# Patient Record
Sex: MALE | Race: WHITE | Employment: FULL TIME | URBAN - METROPOLITAN AREA
[De-identification: names, ages, dates, MRNs, and addresses within clinical notes are randomized per-mention and may not be internally consistent; named-entity substitution may affect disease eponyms.]

---

## 2021-03-11 ENCOUNTER — HOSPITAL ENCOUNTER (EMERGENCY)
Age: 56
Discharge: HOME | End: 2021-03-11
Attending: EMERGENCY MEDICINE
Payer: COMMERCIAL

## 2021-03-11 ENCOUNTER — APPOINTMENT (OUTPATIENT)
Dept: ULTRASOUND IMAGING | Age: 56
End: 2021-03-11
Attending: PHYSICIAN ASSISTANT
Payer: COMMERCIAL

## 2021-03-11 VITALS
OXYGEN SATURATION: 99 % | DIASTOLIC BLOOD PRESSURE: 82 MMHG | HEIGHT: 71 IN | BODY MASS INDEX: 31.2 KG/M2 | WEIGHT: 222.88 LBS | TEMPERATURE: 98.4 F | HEART RATE: 60 BPM | RESPIRATION RATE: 12 BRPM | SYSTOLIC BLOOD PRESSURE: 133 MMHG

## 2021-03-11 DIAGNOSIS — I82.812 SAPHENOUS VEIN CLOT, LEFT: Primary | ICD-10-CM

## 2021-03-11 LAB
ANION GAP BLD CALC-SCNC: 15 MMOL/L (ref 10–20)
BUN BLD-MCNC: 11 MG/DL (ref 9–20)
CA-I BLD-MCNC: 1.29 MMOL/L (ref 1.12–1.32)
CHLORIDE BLD-SCNC: 103 MMOL/L (ref 98–107)
CO2 BLD-SCNC: 27 MMOL/L (ref 21–32)
CREAT BLD-MCNC: 1 MG/DL (ref 0.6–1.3)
GLUCOSE BLD-MCNC: 104 MG/DL (ref 65–100)
HCT VFR BLD CALC: 46 % (ref 36.6–50.3)
POTASSIUM BLD-SCNC: 4.3 MMOL/L (ref 3.5–5.1)
SERVICE CMNT-IMP: ABNORMAL
SODIUM BLD-SCNC: 140 MMOL/L (ref 136–145)

## 2021-03-11 PROCEDURE — 80047 BASIC METABLC PNL IONIZED CA: CPT

## 2021-03-11 PROCEDURE — 93971 EXTREMITY STUDY: CPT

## 2021-03-11 PROCEDURE — 99284 EMERGENCY DEPT VISIT MOD MDM: CPT

## 2021-03-11 NOTE — ED NOTES
Pt states that he has area on his left calf that is warm and getting worse since friday. Pt has family hx of clots and factor 5 for himself. Pt denies sob, cp, fevers, chills, n/v/d. Pt resting in bed with call bell within reach. Pt a/o x4.

## 2021-03-11 NOTE — ED NOTES
TRANSFER - IN REPORT:    Verbal and bedside report received from Steve Spann (name) on Suzanne Sethi. Report consisted of patients Situation, Background, Assessment and   Recommendations(SBAR). Information from the following report(s) SBAR and ED Summary was reviewed with the receiving nurse. Opportunity for questions and clarification was provided. US at bedside. 2992: Dr. Rey Blas at bedside. 0920: Pt discharged by Dr. Tay He. Pt provided with discharge instructions Rx and instructions on follow up care. Pt out of ED ambulatory  accompanied by wife.

## 2021-03-11 NOTE — ED PROVIDER NOTES
EMERGENCY DEPARTMENT HISTORY AND PHYSICAL EXAM      Date: 3/11/2021  Patient Name: Dolly Carlisle    History of Presenting Illness     Chief Complaint   Patient presents with    Leg Pain     pt arrives ambultory to the ED with c/o LL leg pain x2 days with worsening pain today. pt states his calf started aching, with redness, and swelling at the site without injury or trauma. pt states he is concerned for a blood clot       History Provided By: Patient    HPI: Dolly Carlisle, 63 y.o. male with significant PMHx for factor 5 liden, presents by POV to the ED with cc of non-traumatic left lower leg pain for the past 4-5 days. He notes that the pain is in his proximal calf and radiates to the popliteal fossa. It is a constant pain that worsens with palpation. They has been no treatment PTA. He denies recent surgeries or sedentary periods. There is been no shortness of breath, chest pain, palpitations. He denies headache, lightheadedness or dizziness. There are no other complaints, changes, or physical findings at this time. Social Hx: Tobacco (occasional smoker), EtOH (2-3 beers nightly), Illicit drug use (denies)     PCP: No primary care provider on file. No current facility-administered medications on file prior to encounter. No current outpatient medications on file prior to encounter. Past History     Past Medical History:  No past medical history on file. Past Surgical History:  No past surgical history on file. Family History:  No family history on file. Social History:  Social History     Tobacco Use    Smoking status: Not on file   Substance Use Topics    Alcohol use: Not on file    Drug use: Not on file       Allergies:  Not on File      Review of Systems   Review of Systems   Constitutional: Negative for chills, diaphoresis and fever. HENT: Negative for congestion, ear pain, rhinorrhea and sore throat. Respiratory: Negative for cough and shortness of breath. Cardiovascular: Negative for chest pain. Gastrointestinal: Negative for abdominal pain, constipation, diarrhea, nausea and vomiting. Genitourinary: Negative for difficulty urinating, dysuria, frequency and hematuria. Musculoskeletal: Positive for joint swelling and myalgias. Negative for arthralgias, back pain and gait problem. Neurological: Negative for headaches. All other systems reviewed and are negative. Physical Exam   Physical Exam  Vitals signs and nursing note reviewed. Constitutional:       General: He is not in acute distress. Appearance: He is well-developed. He is not diaphoretic. Comments: 63 y.o.  male    HENT:      Head: Normocephalic and atraumatic. Eyes:      General:         Right eye: No discharge. Left eye: No discharge. Conjunctiva/sclera: Conjunctivae normal.   Neck:      Musculoskeletal: Normal range of motion and neck supple. Cardiovascular:      Rate and Rhythm: Normal rate and regular rhythm. Pulses: Normal pulses. Heart sounds: Normal heart sounds. No murmur. Pulmonary:      Effort: Pulmonary effort is normal. No respiratory distress. Breath sounds: Normal breath sounds. Musculoskeletal:      Comments: Tender to palpation of the left calf with slight swelling and palpable cord. Palpable pedal pulses. Cap refill less than 2 seconds. Ambulatory with slight limp. Skin:     General: Skin is warm and dry. Neurological:      Mental Status: He is alert and oriented to person, place, and time.    Psychiatric:         Behavior: Behavior normal.         Diagnostic Study Results     Labs -  Recent Results (from the past 24 hour(s))   POC CHEM8    Collection Time: 03/11/21  8:56 AM   Result Value Ref Range    Calcium, ionized (POC) 1.29 1.12 - 1.32 mmol/L    Sodium (POC) 140 136 - 145 mmol/L    Potassium (POC) 4.3 3.5 - 5.1 mmol/L    Chloride (POC) 103 98 - 107 mmol/L    CO2 (POC) 27 21 - 32 mmol/L    Anion gap (POC) 15 10 - 20 mmol/L    Glucose (POC) 104 (H) 65 - 100 mg/dL    BUN (POC) 11 9 - 20 mg/dL    Creatinine (POC) 1.0 0.6 - 1.3 mg/dL    GFRAA, POC >60 >60 ml/min/1.73m2    GFRNA, POC >60 >60 ml/min/1.73m2    Hematocrit (POC) 46 36.6 - 50.3 %    Comment Comment Not Indicated. Radiologic Studies -     Left Lower Venous  Acute non-occlusive thrombus present in the left great saphenous thigh vein. Acute occlusive thrombus present in the left great saphenous calf vein. Varicose vein(s) present. Varicose veins are thrombosed. The veins with thrombus show signs of reduced compressibility during transverse, 2D imaging. Acute superficial thrombophlebitis noted within the left great saphenous vein. The common femoral, saphenous femoral junction, greater saphenous ankle, profunda femoral, femoral, proximal femoral, mid femoral, distal femoral, popliteal, gastrocnemius, soleal, posterior tibial and peroneal vein(s) were imaged in the transverse and longitudinal planes. The vessels showed normal color filling and compressibility. Doppler interrogation of the veins showed phasic and spontaneous flow. The anterior tibial vein(s) were not well visualized. Positive LT Greater Saph clot  Mid thigh to dist calf. Clotted varicose veins prx to mid calf     Medical Decision Making   I am the first provider for this patient. I reviewed the vital signs, available nursing notes, past medical history, past surgical history, family history and social history. Vital Signs-Reviewed the patient's vital signs. Patient Vitals for the past 12 hrs:   Temp Pulse Resp BP SpO2   03/11/21 0619 98.4 °F (36.9 °C) 60 12 (!) 144/94 100 %       Records Reviewed: Nursing Notes    Provider Notes (Medical Decision Making): The patient presents to the ED with non-traumatic leg pain. His vitals are stable. DDx DVT, thrombophlebitis, sprain, strain, spasm, electrolyte abnl.   US shows large clot to the saphenous vein, which should be treated like a DVT. Anticoagulants started. Patient to follow up with vascular surgeon and PCP. ED Course:   Initial assessment performed. The patients presenting problems have been discussed, and they are in agreement with the care plan formulated and outlined with them. I have encouraged them to ask questions as they arise throughout their visit. 8:00 AM   Case discussed with Dr. Anastasiya Lopez and care turned over. He should follow up on kidney function and write patient prescriptions for anticoagulants. Critical Care Time: None    Disposition:  DISCHARGE NOTE:  4:35 AM  The pt is ready for discharge. The pt's signs, symptoms, diagnosis, and discharge instructions have been discussed and pt has conveyed their understanding. The pt is to follow up as recommended or return to ER should their symptoms worsen. Plan has been discussed and pt is in agreement. PLAN:  1. Take Xarelto as discussed  2. Follow-up Information     Follow up With Specialties Details Why Contact Info    William Nascimento MD Vascular Surgery In 1 week  [address] [place] [address]  [phone]          Return to ED if worse     Diagnosis     Clinical Impression:   1. Saphenous vein clot, left          Please note that this dictation was completed with [employer], the computer voice recognition software. Quite often unanticipated grammatical, syntax, homophones, and other interpretive errors are inadvertently transcribed by the computer software. Please disregards these errors. Please excuse any errors that have escaped final proofreading.

## 2021-04-08 ENCOUNTER — HOSPITAL ENCOUNTER (EMERGENCY)
Age: 56
Discharge: HOME | End: 2021-04-08
Attending: EMERGENCY MEDICINE
Payer: COMMERCIAL

## 2021-04-08 ENCOUNTER — APPOINTMENT (OUTPATIENT)
Dept: GENERAL RADIOLOGY | Age: 56
End: 2021-04-08
Attending: EMERGENCY MEDICINE
Payer: COMMERCIAL

## 2021-04-08 ENCOUNTER — APPOINTMENT (OUTPATIENT)
Dept: CT IMAGING | Age: 56
End: 2021-04-08
Payer: COMMERCIAL

## 2021-04-08 VITALS
HEART RATE: 63 BPM | OXYGEN SATURATION: 100 % | WEIGHT: 214.95 LBS | DIASTOLIC BLOOD PRESSURE: 82 MMHG | RESPIRATION RATE: 14 BRPM | BODY MASS INDEX: 29.98 KG/M2 | SYSTOLIC BLOOD PRESSURE: 139 MMHG | TEMPERATURE: 97.9 F

## 2021-04-08 DIAGNOSIS — R07.9 CHEST PAIN, UNSPECIFIED TYPE: Primary | ICD-10-CM

## 2021-04-08 LAB
ALBUMIN SERPL-MCNC: 4.7 G/DL (ref 3.5–5)
ALBUMIN/GLOB SERPL: 1.6 {RATIO} (ref 1.1–2.2)
ALP SERPL-CCNC: 64 U/L (ref 45–117)
ALT SERPL-CCNC: 76 U/L (ref 12–78)
ANION GAP SERPL CALC-SCNC: 4 MMOL/L (ref 5–15)
AST SERPL-CCNC: 46 U/L (ref 15–37)
ATRIAL RATE: 57 BPM
BASOPHILS # BLD: 0.1 K/UL (ref 0–0.1)
BASOPHILS NFR BLD: 1 % (ref 0–1)
BILIRUB SERPL-MCNC: 1.3 MG/DL (ref 0.2–1)
BUN SERPL-MCNC: 15 MG/DL (ref 6–20)
BUN/CREAT SERPL: 15 (ref 12–20)
CALCIUM SERPL-MCNC: 9.1 MG/DL (ref 8.5–10.1)
CALCULATED P AXIS, ECG09: 9 DEGREES
CALCULATED R AXIS, ECG10: -10 DEGREES
CALCULATED T AXIS, ECG11: 18 DEGREES
CHLORIDE SERPL-SCNC: 104 MMOL/L (ref 97–108)
CO2 SERPL-SCNC: 29 MMOL/L (ref 21–32)
CREAT SERPL-MCNC: 1 MG/DL (ref 0.7–1.3)
DIAGNOSIS, 93000: NORMAL
DIFFERENTIAL METHOD BLD: NORMAL
EOSINOPHIL # BLD: 0.2 K/UL (ref 0–0.4)
EOSINOPHIL NFR BLD: 3 % (ref 0–7)
ERYTHROCYTE [DISTWIDTH] IN BLOOD BY AUTOMATED COUNT: 12.9 % (ref 11.5–14.5)
GLOBULIN SER CALC-MCNC: 2.9 G/DL (ref 2–4)
GLUCOSE SERPL-MCNC: 105 MG/DL (ref 65–100)
HCT VFR BLD AUTO: 47.5 % (ref 36.6–50.3)
HGB BLD-MCNC: 16.4 G/DL (ref 12.1–17)
IMM GRANULOCYTES # BLD AUTO: 0 K/UL (ref 0–0.04)
IMM GRANULOCYTES NFR BLD AUTO: 0 % (ref 0–0.5)
LYMPHOCYTES # BLD: 0.9 K/UL (ref 0.8–3.5)
LYMPHOCYTES NFR BLD: 16 % (ref 12–49)
MCH RBC QN AUTO: 32.7 PG (ref 26–34)
MCHC RBC AUTO-ENTMCNC: 34.5 G/DL (ref 30–36.5)
MCV RBC AUTO: 94.8 FL (ref 80–99)
MONOCYTES # BLD: 0.7 K/UL (ref 0–1)
MONOCYTES NFR BLD: 12 % (ref 5–13)
NEUTS SEG # BLD: 3.9 K/UL (ref 1.8–8)
NEUTS SEG NFR BLD: 68 % (ref 32–75)
NRBC # BLD: 0 K/UL (ref 0–0.01)
NRBC BLD-RTO: 0 PER 100 WBC
P-R INTERVAL, ECG05: 142 MS
PLATELET # BLD AUTO: 152 K/UL (ref 150–400)
PMV BLD AUTO: 11.6 FL (ref 8.9–12.9)
POTASSIUM SERPL-SCNC: 4.2 MMOL/L (ref 3.5–5.1)
PROT SERPL-MCNC: 7.6 G/DL (ref 6.4–8.2)
Q-T INTERVAL, ECG07: 422 MS
QRS DURATION, ECG06: 96 MS
QTC CALCULATION (BEZET), ECG08: 410 MS
RBC # BLD AUTO: 5.01 M/UL (ref 4.1–5.7)
SODIUM SERPL-SCNC: 137 MMOL/L (ref 136–145)
TROPONIN I BLD-MCNC: <0.04 NG/ML (ref 0–0.08)
TROPONIN I SERPL-MCNC: <0.05 NG/ML
VENTRICULAR RATE, ECG03: 57 BPM
WBC # BLD AUTO: 5.8 K/UL (ref 4.1–11.1)

## 2021-04-08 PROCEDURE — 74011250637 HC RX REV CODE- 250/637: Performed by: EMERGENCY MEDICINE

## 2021-04-08 PROCEDURE — 99281 EMR DPT VST MAYX REQ PHY/QHP: CPT

## 2021-04-08 PROCEDURE — 84484 ASSAY OF TROPONIN QUANT: CPT

## 2021-04-08 PROCEDURE — 93005 ELECTROCARDIOGRAM TRACING: CPT

## 2021-04-08 PROCEDURE — 71275 CT ANGIOGRAPHY CHEST: CPT

## 2021-04-08 PROCEDURE — 85025 COMPLETE CBC W/AUTO DIFF WBC: CPT

## 2021-04-08 PROCEDURE — 99284 EMERGENCY DEPT VISIT MOD MDM: CPT

## 2021-04-08 PROCEDURE — 36415 COLL VENOUS BLD VENIPUNCTURE: CPT

## 2021-04-08 PROCEDURE — 74011000250 HC RX REV CODE- 250: Performed by: EMERGENCY MEDICINE

## 2021-04-08 PROCEDURE — 74011000636 HC RX REV CODE- 636: Performed by: EMERGENCY MEDICINE

## 2021-04-08 PROCEDURE — 80053 COMPREHEN METABOLIC PANEL: CPT

## 2021-04-08 PROCEDURE — 71045 X-RAY EXAM CHEST 1 VIEW: CPT

## 2021-04-08 RX ADMIN — IOPAMIDOL 100 ML: 755 INJECTION, SOLUTION INTRAVENOUS at 12:32

## 2021-04-08 RX ADMIN — LIDOCAINE HYDROCHLORIDE 40 ML: 20 SOLUTION ORAL; TOPICAL at 14:43

## 2021-04-08 NOTE — CONSULTS
Pt seen and examined    Full consult dictated    Recc repeat enzymes.  If neg can d/c home with outpt stress nuclear or echo

## 2021-04-08 NOTE — ED NOTES
Discharge paperwork provided to patient at this time by Sherren Creek, DO. Vital signs stable. Patient in no apparent distress at this time. Mental status at baseline. Discharge paperwork in hand and prescription instructions if applicable.

## 2021-04-09 NOTE — CONSULTS
[address] [facility]    Name:  Jon Bellamy  MR#:  [id number]  :  1965  ACCOUNT #:  [de-identified]  DATE OF SERVICE:  2021    REQUESTING PHYSICIAN:  Yarelis Hamm DO, [facility] ER.    REASON FOR CONSULTATION:  Evaluate chest pain and left arm pain. HISTORY OF PRESENT ILLNESS:  The patient is a 54-year-old man with a history of hypertension, dyslipidemia, and factor V Leiden deficiency. He presented after he developed midepigastric discomfort and left arm discomfort earlier today. He has no prior history of coronary artery disease. These symptoms occurred at rest.  The patient came to the ER where his initial EKG was unremarkable and initial troponin was negative. We were asked to see the patient for evaluation. 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  He is on his feet a lot and has no trouble walking up steps. No exertional chest pain. No syncope. No palpitations. No episodes of shortness of breath. No prior stress testing. The patient also had a CTA of the chest done today, which showed no evidence of pulmonary embolism. PAST MEDICAL HISTORY:  As noted above, history of recent superficial venous thrombosis in the lower extremity, he was on Eliquis for a few days, history of DJD involving the cervical spine, history of varicose vein. CURRENT MEDICATIONS:  Amlodipine, Naprosyn, atorvastatin, fluoxetine. SURGERIES:  Status post prior neck surgery, status post tonsillectomy and adenoidectomy. SOCIAL HISTORY:  The patient occasionally drinks alcohol and does smoke cigarettes occasionally. 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 FAMILY HISTORY:  The patient's father had a heart attack at age 47. His brother also has coronary artery disease. REVIEW OF SYSTEMS:  As noted above, otherwise noncontributory. PHYSICAL EXAMINATION:  GENERAL:  Exam reveals a middle-aged white male in no acute distress.   VITAL SIGNS:  Blood pressure 139/82, pulse 68, respirations 14, temperature 97.9. HEENT:  Pupils are equal.  NECK:  Supple. No masses or thyromegaly. No cervical or supraclavicular adenopathy. No carotid bruits. No JVD. CHEST:  Clear. No wheezes or crackles. CARDIAC:  Regular rate and rhythm. Normal S1 and S2. No obvious murmurs, rubs, or gallops. ABDOMEN:  Soft, nontender. No masses or organomegaly. Bowel sounds positive. EXTREMITIES:  No cyanosis, clubbing, or edema. Distal pulses 1+ in the feet bilaterally. There are some superficial varicose veins present. NEURO:  No obvious gross motor deficits. SKIN:  Warm and dry. LABORATORY DATA:  Hemoglobin 11.4. Troponin negative. BUN 15, creatinine 1.0. EKG, sinus bradycardia, rate of 57 beats per minute, borderline left axis deviation, nonspecific ST-T changes. IMPRESSION:  1. Chest discomfort of uncertain etiology. 2.  Hypertension. 3.  Dyslipidemia. 4.  History of factor V Leiden deficiency. 5.  History of varicose veins with recent superficial thrombosis in the lower extremity. 6.  Tobacco abuse. 7.  Strong family history of premature coronary disease. RECOMMENDATIONS:  We are currently awaiting the second set of cardiac enzymes. I think if the second set is normal, the patient could be discharged to home with outpatient stress testing. I have recommended he get a stress test done in the next one to two weeks. In the meantime, continue current medications and the patient should return to the ER if his symptoms return. Thank you for this consult.         Becca Lehman MD      [id number]  D:  04/08/2021 15:42  T:  04/08/2021 20:51  JOB #:  [id number]  CC:  MD Tricia Marquez DO

## 2021-04-09 NOTE — ED PROVIDER NOTES
EMERGENCY DEPARTMENT HISTORY AND PHYSICAL EXAM      Date: 4/8/2021  Patient Name: Cece Mai    History of Presenting Illness     Chief Complaint   Patient presents with    Chest Pain     3-4 days of CP and began to cause L arm pain this morning so he came in. pt has factor 5       History Provided By: Patient    HPI: Cece Mai, 63 y.o. male presents to the ED with cc of chest pain. Pt states he has been having mild discomfort in his chest the past 3 days. He was at work this morning and his pain increased and he noted pain radiating into his left arm. He states pain was more severe but now rated 3/10. There are no alleviating or exacerbating factors. He denies any SOA. There has been no cough or cold symptoms. He denies any abdominal pain, n/v/d. He denies any diaphoresis. He denies any headache. There has been no diaphoresis. He denies any melena, hematochezia or BRBPR. There has been no pain or swelling of the lower extremities. He had recently been in the ED and placed on Xarelto for a clot in the saphenous vein. He followed up with Vasc Surgery who discon't Xarelto as saphenous clot does not need to be treated as DVT. There are no other complaints, changes, or physical findings at this time. PCP: Bhumi, MD Jake    No current facility-administered medications on file prior to encounter. Current Outpatient Medications on File Prior to Encounter   Medication Sig Dispense Refill    rivaroxaban (Xarelto) 15 mg tab tablet Take 1 Tab by mouth two (2) times daily (with meals). 43 Tab 0   Taken off Xarelto by Vasc surgery     Past History     Past Medical History:  Recent clot of saphenous vein    Past Surgical History:  None    Family History:  Brother- MI,   Father- MI    Social History:  Social History     Tobacco Use    Smoking status: Yes   Substance Use Topics    Alcohol use: No    Drug use: No       Allergies:   Allergies   Allergen Reactions    Opioids-Meperidine And Related Other (comments)    Penicillins Other (comments)     Caused c-diff         Review of Systems   Review of Systems   Constitutional: Negative. Negative for appetite change, chills, fatigue and fever. HENT: Negative. Negative for congestion, rhinorrhea, sinus pressure and sore throat. Eyes: Negative. Respiratory: Negative. Negative for cough, choking, chest tightness, shortness of breath and wheezing. Cardiovascular: Positive for chest pain (radiating into left arm). Negative for palpitations and leg swelling. Gastrointestinal: Negative for abdominal pain, constipation, diarrhea, nausea and vomiting. Endocrine: Negative. Genitourinary: Negative. Negative for difficulty urinating, dysuria, flank pain and urgency. Musculoskeletal: Negative. Skin: Negative. Neurological: Negative. Negative for dizziness, speech difficulty, weakness, light-headedness, numbness and headaches. Psychiatric/Behavioral: Negative. All other systems reviewed and are negative. Physical Exam   Physical Exam  Vitals signs and nursing note reviewed. Constitutional:       General: He is not in acute distress. Appearance: He is well-developed. He is obese. He is not diaphoretic. HENT:      Head: Normocephalic and atraumatic. Mouth/Throat:      Pharynx: No oropharyngeal exudate. Eyes:      Extraocular Movements: Extraocular movements intact. Conjunctiva/sclera: Conjunctivae normal.      Pupils: Pupils are equal, round, and reactive to light. Neck:      Musculoskeletal: Normal range of motion and neck supple. Vascular: No JVD. Trachea: No tracheal deviation. Cardiovascular:      Rate and Rhythm: Normal rate and regular rhythm. Heart sounds: Normal heart sounds. No murmur. Pulmonary:      Effort: Pulmonary effort is normal. No respiratory distress. Breath sounds: Normal breath sounds. No stridor. No wheezing or rales. Chest:      Chest wall: No tenderness.    Abdominal: General: There is no distension. Palpations: Abdomen is soft. Tenderness: There is no abdominal tenderness. There is no guarding or rebound. Musculoskeletal: Normal range of motion. General: No tenderness. Right lower leg: No edema. Left lower leg: No edema. Skin:     General: Skin is warm and dry. Capillary Refill: Capillary refill takes less than 2 seconds. Neurological:      Mental Status: He is alert and oriented to person, place, and time. Cranial Nerves: No cranial nerve deficit. Comments: No gross motor or sensory deficits    Psychiatric:         Mood and Affect: Mood normal.         Behavior: Behavior normal.         Diagnostic Study Results     Labs -     Recent Results (from the past 24 hour(s))   EKG, 12 LEAD, INITIAL    Collection Time: 04/08/21  9:11 AM   Result Value Ref Range    Ventricular Rate 57 BPM    Atrial Rate 57 BPM    P-R Interval 142 ms    QRS Duration 96 ms    Q-T Interval 422 ms    QTC Calculation (Bezet) 410 ms    Calculated P Axis 9 degrees    Calculated R Axis -10 degrees    Calculated T Axis 18 degrees    Diagnosis       Sinus bradycardia  No previous ECGs available  Confirmed by MAGNO Chavez ([id number]) on 4/8/2021 5:24:49 PM     CBC WITH AUTOMATED DIFF    Collection Time: 04/08/21  9:22 AM   Result Value Ref Range    WBC 5.8 4.1 - 11.1 K/uL    RBC 5.01 4.10 - 5.70 M/uL    HGB 16.4 12.1 - 17.0 g/dL    HCT 47.5 36.6 - 50.3 %    MCV 94.8 80.0 - 99.0 FL    MCH 32.7 26.0 - 34.0 PG    MCHC 34.5 30.0 - 36.5 g/dL    RDW 12.9 11.5 - 14.5 %    PLATELET 013 018 - 586 K/uL    MPV 11.6 8.9 - 12.9 FL    NRBC 0.0 0  WBC    ABSOLUTE NRBC 0.00 0.00 - 0.01 K/uL    NEUTROPHILS 68 32 - 75 %    LYMPHOCYTES 16 12 - 49 %    MONOCYTES 12 5 - 13 %    EOSINOPHILS 3 0 - 7 %    BASOPHILS 1 0 - 1 %    IMMATURE GRANULOCYTES 0 0.0 - 0.5 %    ABS. NEUTROPHILS 3.9 1.8 - 8.0 K/UL    ABS. LYMPHOCYTES 0.9 0.8 - 3.5 K/UL    ABS. MONOCYTES 0.7 0.0 - 1.0 K/UL    ABS. EOSINOPHILS 0.2 0.0 - 0.4 K/UL    ABS. BASOPHILS 0.1 0.0 - 0.1 K/UL    ABS. IMM. GRANS. 0.0 0.00 - 0.04 K/UL    DF AUTOMATED     METABOLIC PANEL, COMPREHENSIVE    Collection Time: 04/08/21  9:22 AM   Result Value Ref Range    Sodium 137 136 - 145 mmol/L    Potassium 4.2 3.5 - 5.1 mmol/L    Chloride 104 97 - 108 mmol/L    CO2 29 21 - 32 mmol/L    Anion gap 4 (L) 5 - 15 mmol/L    Glucose 105 (H) 65 - 100 mg/dL    BUN 15 6 - 20 MG/DL    Creatinine 1.00 0.70 - 1.30 MG/DL    BUN/Creatinine ratio 15 12 - 20      GFR est AA >60 >60 ml/min/1.73m2    GFR est non-AA >60 >60 ml/min/1.73m2    Calcium 9.1 8.5 - 10.1 MG/DL    Bilirubin, total 1.3 (H) 0.2 - 1.0 MG/DL    ALT (SGPT) 76 12 - 78 U/L    AST (SGOT) 46 (H) 15 - 37 U/L    Alk. phosphatase 64 45 - 117 U/L    Protein, total 7.6 6.4 - 8.2 g/dL    Albumin 4.7 3.5 - 5.0 g/dL    Globulin 2.9 2.0 - 4.0 g/dL    A-G Ratio 1.6 1.1 - 2.2     TROPONIN I    Collection Time: 04/08/21  9:22 AM   Result Value Ref Range    Troponin-I, Qt. <0.05 <0.05 ng/mL   POC TROPONIN-I    Collection Time: 04/08/21  2:46 PM   Result Value Ref Range    Troponin-I (POC) <0.04 0.00 - 0.08 ng/mL         Radiologic Studies -   CTA CHEST W OR W WO CONT   Final Result   No pulmonary emboli are identified. . The lungs are clear of an acute   process. .         XR CHEST PORT   Final Result   1. There is no pneumonia. 2. The lungs are mildly hyperinflated        CT Results  (Last 48 hours)               04/08/21 1232  CTA CHEST W OR W WO CONT Final result    Impression:  No pulmonary emboli are identified. . The lungs are clear of an acute   process. .           Narrative:  INDICATION:   chest pain, h/o clots, on blood thinner currently       COMPARISON: None. TECHNIQUE:    Precontrast  images were obtained to localize the volume for acquisition.    Multislice helical CT arteriography was performed from the diaphragm to the   thoracic inlet during uneventful rapid bolus intravenous administration of 100 mL of Isovue-370. Lung and soft tissue windows were generated. Post processing   was performed and coronal reformatted images were also generated. 3 D imaging   was performed. CT dose reduction was achieved through use of a standardized   protocol tailored for this examination and automatic exposure control for dose   modulation. FINDINGS:   The lungs are clear of mass, nodule, airspace disease or edema. The pulmonary arteries are well enhanced and no pulmonary emboli are identified. There is no mediastinal or hilar adenopathy or mass. The aorta enhances normally   without evidence of aneurysm or dissection. The visualized portions of the upper abdominal organs are normal.               CXR Results  (Last 48 hours)               04/08/21 1024  XR CHEST PORT Final result    Impression:  1. There is no pneumonia. 2. The lungs are mildly hyperinflated       Narrative:  EXAM: XR CHEST PORT       INDICATION: chest pain       COMPARISON: None. FINDINGS: A portable AP radiograph of the chest was obtained at 1028 hours. The   heart size is normal. The lungs are deeply aerated and mildly hyperinflated. No   pneumonia. No pulmonary edema. Osseous structures reveal that the patient is   status post cervical spine surgery. Medical Decision Making   I am the first provider for this patient. I reviewed the vital signs, available nursing notes, past medical history, past surgical history, family history and social history. Vital Signs-Reviewed the patient's vital signs. EKG interpretation: (Preliminary)  Sinus ilsa, rate 57, normal axis/pr/qrs, no acute ST changes, Tate Passe, DO      Records Reviewed: Nursing Notes, Old Medical Records, Previous Radiology Studies and Previous Laboratory Studies    Provider Notes (Medical Decision Making):   DDx- ACS< chest wall pain, GERD, PE, pneumonia,     ED Course:   Initial assessment performed.  The patients presenting problems have been discussed, and they are in agreement with the care plan formulated and outlined with them. I have encouraged them to ask questions as they arise throughout their visit. Pt with some improvement following GI cocktail, but not resolved, wished to defer other meds. Some of his pain pleuritic in nature will obtain CT to r/o PE.     CT neg, pt with substantial family history. Will consult Cards. Consult:  Case discussed with Dr. Marie Xavier. He has evaluated pt Pt can be dc home will arrange for follow-up and outpt stress test.      Disposition:  DC home     DISCHARGE PLAN:  1. Discharge Medication List as of 4/8/2021  4:41 PM        2. Follow-up Information     Follow up With Specialties Details Why Contact Info    Lucia Ashraf MD Cardiology   [address]  [id number]  [facility]. [phone]          3. Return to ED if worse     Diagnosis     Clinical Impression:   1. Chest pain, unspecified type        Attestations:    Ellen Blevins, DO    Please note that this dictation was completed with [employer], the computer voice recognition software. Quite often unanticipated grammatical, syntax, homophones, and other interpretive errors are inadvertently transcribed by the computer software. Please disregard these errors. Please excuse any errors that have escaped final proofreading. Thank you.

## 2021-04-20 ENCOUNTER — OFFICE VISIT (OUTPATIENT)
Dept: INTERNAL MEDICINE CLINIC | Age: 56
End: 2021-04-20
Payer: COMMERCIAL

## 2021-04-20 VITALS
SYSTOLIC BLOOD PRESSURE: 114 MMHG | BODY MASS INDEX: 41.04 KG/M2 | DIASTOLIC BLOOD PRESSURE: 79 MMHG | HEIGHT: 61 IN | TEMPERATURE: 96.1 F | RESPIRATION RATE: 16 BRPM | OXYGEN SATURATION: 96 % | HEART RATE: 61 BPM | WEIGHT: 217.4 LBS

## 2021-04-20 DIAGNOSIS — D68.51 FACTOR V LEIDEN (HCC): ICD-10-CM

## 2021-04-20 DIAGNOSIS — I10 ESSENTIAL HYPERTENSION: ICD-10-CM

## 2021-04-20 DIAGNOSIS — F41.9 ANXIETY: ICD-10-CM

## 2021-04-20 DIAGNOSIS — M77.11 LATERAL EPICONDYLITIS OF RIGHT ELBOW: Primary | ICD-10-CM

## 2021-04-20 DIAGNOSIS — E78.2 MIXED HYPERLIPIDEMIA: ICD-10-CM

## 2021-04-20 DIAGNOSIS — M54.12 CERVICAL RADICULOPATHY: ICD-10-CM

## 2021-04-20 PROCEDURE — 99204 OFFICE O/P NEW MOD 45 MIN: CPT | Performed by: INTERNAL MEDICINE

## 2021-04-20 RX ORDER — NAPROXEN 500 MG/1
500 TABLET ORAL 2 TIMES DAILY WITH MEALS
COMMUNITY

## 2021-04-20 RX ORDER — ZOLPIDEM TARTRATE 10 MG/1
TABLET ORAL
COMMUNITY
End: 2021-05-24

## 2021-04-20 RX ORDER — SILDENAFIL CITRATE 20 MG/1
20 TABLET ORAL 3 TIMES DAILY
COMMUNITY
End: 2022-09-14 | Stop reason: SDUPTHER

## 2021-04-20 RX ORDER — DIAZEPAM 2 MG/1
2 TABLET ORAL
Qty: 30 TAB | Refills: 1 | Status: SHIPPED | OUTPATIENT
Start: 2021-04-20 | End: 2022-01-25

## 2021-04-20 RX ORDER — ATORVASTATIN CALCIUM 10 MG/1
TABLET, FILM COATED ORAL DAILY
COMMUNITY
End: 2022-03-21 | Stop reason: ALTCHOICE

## 2021-04-20 RX ORDER — LISINOPRIL 10 MG/1
TABLET ORAL DAILY
COMMUNITY
End: 2022-03-22 | Stop reason: SDUPTHER

## 2021-04-20 RX ORDER — OMEPRAZOLE 20 MG/1
20 TABLET, DELAYED RELEASE ORAL DAILY
COMMUNITY

## 2021-04-20 RX ORDER — BENZONATATE 100 MG/1
100 CAPSULE ORAL
COMMUNITY
End: 2022-08-04

## 2021-04-20 RX ORDER — CYCLOBENZAPRINE HCL 10 MG
TABLET ORAL
COMMUNITY
End: 2022-02-04 | Stop reason: SDUPTHER

## 2021-04-20 RX ORDER — FLUOXETINE HYDROCHLORIDE 20 MG/1
CAPSULE ORAL DAILY
COMMUNITY
End: 2022-03-22 | Stop reason: SDUPTHER

## 2021-04-20 RX ORDER — AMLODIPINE BESYLATE AND ATORVASTATIN CALCIUM 10; 10 MG/1; MG/1
1 TABLET, FILM COATED ORAL DAILY
COMMUNITY
End: 2022-02-04 | Stop reason: SDUPTHER
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
## 2021-05-24 DIAGNOSIS — G47.00 INSOMNIA, UNSPECIFIED TYPE: Primary | ICD-10-CM

## 2021-05-24 RX ORDER — ZOLPIDEM TARTRATE 10 MG/1
TABLET ORAL
Qty: 30 TABLET | Refills: 2 | Status: SHIPPED | OUTPATIENT
Start: 2021-05-24 | End: 2021-10-25

## 2021-10-25 DIAGNOSIS — G47.00 INSOMNIA, UNSPECIFIED TYPE: ICD-10-CM

## 2021-10-25 RX ORDER — ZOLPIDEM TARTRATE 10 MG/1
TABLET ORAL
Qty: 30 TABLET | Refills: 2 | Status: SHIPPED | OUTPATIENT
Start: 2021-10-25 | End: 2022-03-28

## 2022-01-14 ENCOUNTER — VIRTUAL VISIT (OUTPATIENT)
Dept: INTERNAL MEDICINE CLINIC | Age: 57
End: 2022-01-14
Payer: COMMERCIAL

## 2022-01-14 DIAGNOSIS — F41.8 DEPRESSION WITH ANXIETY: ICD-10-CM

## 2022-01-14 DIAGNOSIS — I10 ESSENTIAL HYPERTENSION: ICD-10-CM

## 2022-01-14 DIAGNOSIS — Z12.5 SCREENING FOR PROSTATE CANCER: ICD-10-CM

## 2022-01-14 DIAGNOSIS — E78.2 MIXED HYPERLIPIDEMIA: ICD-10-CM

## 2022-01-14 DIAGNOSIS — F90.9 ATTENTION DEFICIT HYPERACTIVITY DISORDER (ADHD), UNSPECIFIED ADHD TYPE: ICD-10-CM

## 2022-01-14 DIAGNOSIS — Z11.59 ENCOUNTER FOR HEPATITIS C SCREENING TEST FOR LOW RISK PATIENT: ICD-10-CM

## 2022-01-14 DIAGNOSIS — R63.0 ANOREXIA: Primary | ICD-10-CM

## 2022-01-14 PROCEDURE — 99214 OFFICE O/P EST MOD 30 MIN: CPT | Performed by: INTERNAL MEDICINE

## 2022-01-14 RX ORDER — AMLODIPINE BESYLATE 10 MG/1
TABLET ORAL DAILY
COMMUNITY
End: 2022-03-21 | Stop reason: ALTCHOICE
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
## 2022-01-14 NOTE — PROGRESS NOTES
Gael Augustine is a 61 y.o. male  Chief Complaint   Patient presents with    Anorexia     Unable to eat     Health Maintenance Due   Topic Date Due    Hepatitis C Screening  Never done    Pneumococcal 0-64 years (1 of 2 - PPSV23) Never done    DTaP/Tdap/Td series (1 - Tdap) Never done    Colorectal Cancer Screening Combo  Never done    Shingrix Vaccine Age 50> (1 of 2) Never done    COVID-19 Vaccine (3 - Booster for Pfizer series) 07/29/2021    Flu Vaccine (1) 09/01/2021     There were no vitals taken for this visit. No flowsheet data found.     Patient Phone Number/Email:  [phone]

## 2022-01-18 ENCOUNTER — APPOINTMENT (OUTPATIENT)
Dept: INTERNAL MEDICINE CLINIC | Age: 57
End: 2022-01-18

## 2022-01-18 LAB
ALBUMIN SERPL-MCNC: 4.3 G/DL (ref 3.5–5)
ALBUMIN/GLOB SERPL: 1.6 {RATIO} (ref 1.1–2.2)
ALP SERPL-CCNC: 47 U/L (ref 45–117)
ALT SERPL-CCNC: 38 U/L (ref 12–78)
ANION GAP SERPL CALC-SCNC: 2 MMOL/L (ref 5–15)
AST SERPL-CCNC: 21 U/L (ref 15–37)
BASOPHILS # BLD: 0.1 K/UL (ref 0–0.1)
BASOPHILS NFR BLD: 1 % (ref 0–1)
BILIRUB SERPL-MCNC: 0.6 MG/DL (ref 0.2–1)
BUN SERPL-MCNC: 14 MG/DL (ref 6–20)
BUN/CREAT SERPL: 11 (ref 12–20)
CALCIUM SERPL-MCNC: 9.6 MG/DL (ref 8.5–10.1)
CHLORIDE SERPL-SCNC: 106 MMOL/L (ref 97–108)
CHOLEST SERPL-MCNC: 185 MG/DL
CO2 SERPL-SCNC: 30 MMOL/L (ref 21–32)
CREAT SERPL-MCNC: 1.26 MG/DL (ref 0.7–1.3)
DIFFERENTIAL METHOD BLD: ABNORMAL
EOSINOPHIL # BLD: 0.1 K/UL (ref 0–0.4)
EOSINOPHIL NFR BLD: 2 % (ref 0–7)
ERYTHROCYTE [DISTWIDTH] IN BLOOD BY AUTOMATED COUNT: 12.7 % (ref 11.5–14.5)
ERYTHROCYTE [SEDIMENTATION RATE] IN BLOOD: 2 MM/HR (ref 0–20)
GLOBULIN SER CALC-MCNC: 2.7 G/DL (ref 2–4)
GLUCOSE SERPL-MCNC: 101 MG/DL (ref 65–100)
HCT VFR BLD AUTO: 49.6 % (ref 36.6–50.3)
HCV AB SERPL QL IA: NONREACTIVE
HDLC SERPL-MCNC: 83 MG/DL
HDLC SERPL: 2.2 {RATIO} (ref 0–5)
HGB BLD-MCNC: 16.2 G/DL (ref 12.1–17)
IMM GRANULOCYTES # BLD AUTO: 0 K/UL (ref 0–0.04)
IMM GRANULOCYTES NFR BLD AUTO: 0 % (ref 0–0.5)
LDLC SERPL CALC-MCNC: 87.4 MG/DL (ref 0–100)
LYMPHOCYTES # BLD: 0.9 K/UL (ref 0.8–3.5)
LYMPHOCYTES NFR BLD: 15 % (ref 12–49)
MCH RBC QN AUTO: 32.8 PG (ref 26–34)
MCHC RBC AUTO-ENTMCNC: 32.7 G/DL (ref 30–36.5)
MCV RBC AUTO: 100.4 FL (ref 80–99)
MONOCYTES # BLD: 0.7 K/UL (ref 0–1)
MONOCYTES NFR BLD: 10 % (ref 5–13)
NEUTS SEG # BLD: 4.5 K/UL (ref 1.8–8)
NEUTS SEG NFR BLD: 72 % (ref 32–75)
NRBC # BLD: 0 K/UL (ref 0–0.01)
NRBC BLD-RTO: 0 PER 100 WBC
PLATELET # BLD AUTO: 192 K/UL (ref 150–400)
PMV BLD AUTO: 12.5 FL (ref 8.9–12.9)
POTASSIUM SERPL-SCNC: 4.5 MMOL/L (ref 3.5–5.1)
PROT SERPL-MCNC: 7 G/DL (ref 6.4–8.2)
RBC # BLD AUTO: 4.94 M/UL (ref 4.1–5.7)
SODIUM SERPL-SCNC: 138 MMOL/L (ref 136–145)
TRIGL SERPL-MCNC: 73 MG/DL (ref ?–150)
TSH SERPL DL<=0.05 MIU/L-ACNC: 2.24 UIU/ML (ref 0.36–3.74)
VLDLC SERPL CALC-MCNC: 14.6 MG/DL
WBC # BLD AUTO: 6.3 K/UL (ref 4.1–11.1)

## 2022-01-19 LAB
PSA SERPL-MCNC: 1.5 NG/ML (ref 0–4)
REFLEX CRITERIA: NORMAL

## 2022-01-25 DIAGNOSIS — F41.9 ANXIETY: ICD-10-CM

## 2022-01-25 RX ORDER — DIAZEPAM 2 MG/1
TABLET ORAL
Qty: 30 TABLET | Refills: 1 | Status: SHIPPED | OUTPATIENT
Start: 2022-01-25

## 2022-02-04 RX ORDER — AMLODIPINE BESYLATE AND ATORVASTATIN CALCIUM 10; 10 MG/1; MG/1
1 TABLET, FILM COATED ORAL DAILY
Qty: 90 TABLET | Refills: 3 | Status: SHIPPED | OUTPATIENT
Start: 2022-02-04 | End: 2022-03-21 | Stop reason: SDUPTHER

## 2022-02-04 RX ORDER — CYCLOBENZAPRINE HCL 10 MG
10 TABLET ORAL
Qty: 30 TABLET | Refills: 1 | Status: SHIPPED | OUTPATIENT
Start: 2022-02-04 | End: 2022-07-27

## 2022-02-04 NOTE — TELEPHONE ENCOUNTER
Pt came into the office requesting refills for the amlodipine and the flexeril.  Please send the refills to the [facility] on file

## 2022-03-21 RX ORDER — AMLODIPINE BESYLATE AND ATORVASTATIN CALCIUM 10; 10 MG/1; MG/1
1 TABLET, FILM COATED ORAL DAILY
Qty: 90 TABLET | Refills: 3 | Status: SHIPPED | OUTPATIENT
Start: 2022-03-21 | End: 2022-03-24 | Stop reason: SDUPTHER

## 2022-03-22 RX ORDER — LISINOPRIL 10 MG/1
10 TABLET ORAL DAILY
Qty: 90 TABLET | Refills: 3 | Status: SHIPPED | OUTPATIENT
Start: 2022-03-22 | End: 2022-03-24 | Stop reason: SDUPTHER

## 2022-03-22 RX ORDER — FLUOXETINE HYDROCHLORIDE 20 MG/1
20 CAPSULE ORAL DAILY
Qty: 90 CAPSULE | Refills: 3 | Status: SHIPPED | OUTPATIENT
Start: 2022-03-22 | End: 2022-03-24 | Stop reason: SDUPTHER

## 2022-03-24 RX ORDER — AMLODIPINE BESYLATE AND ATORVASTATIN CALCIUM 10; 10 MG/1; MG/1
1 TABLET, FILM COATED ORAL DAILY
Qty: 90 TABLET | Refills: 3 | Status: SHIPPED | OUTPATIENT
Start: 2022-03-24 | End: 2022-07-29 | Stop reason: SDUPTHER

## 2022-03-24 RX ORDER — FLUOXETINE HYDROCHLORIDE 20 MG/1
20 CAPSULE ORAL DAILY
Qty: 90 CAPSULE | Refills: 3 | Status: SHIPPED | OUTPATIENT
Start: 2022-03-24 | End: 2022-04-29

## 2022-03-24 RX ORDER — LISINOPRIL 10 MG/1
10 TABLET ORAL DAILY
Qty: 90 TABLET | Refills: 3 | Status: SHIPPED | OUTPATIENT
Start: 2022-03-24

## 2022-03-28 DIAGNOSIS — G47.00 INSOMNIA, UNSPECIFIED TYPE: ICD-10-CM

## 2022-03-28 RX ORDER — ZOLPIDEM TARTRATE 10 MG/1
TABLET ORAL
Qty: 30 TABLET | Refills: 5 | Status: SHIPPED | OUTPATIENT
Start: 2022-03-28 | End: 2022-07-29 | Stop reason: SDUPTHER

## 2022-04-29 ENCOUNTER — VIRTUAL VISIT (OUTPATIENT)
Dept: INTERNAL MEDICINE CLINIC | Age: 57
End: 2022-04-29
Payer: COMMERCIAL

## 2022-04-29 ENCOUNTER — TELEPHONE (OUTPATIENT)
Dept: INTERNAL MEDICINE CLINIC | Age: 57
End: 2022-04-29

## 2022-04-29 DIAGNOSIS — F41.8 DEPRESSION WITH ANXIETY: Primary | ICD-10-CM

## 2022-04-29 PROCEDURE — 99213 OFFICE O/P EST LOW 20 MIN: CPT | Performed by: INTERNAL MEDICINE

## 2022-04-29 RX ORDER — FLUVOXAMINE MALEATE 100 MG/1
100 CAPSULE, EXTENDED RELEASE ORAL DAILY
Qty: 30 CAPSULE | Refills: 5 | Status: SHIPPED | OUTPATIENT
Start: 2022-04-29

## 2022-04-29 NOTE — TELEPHONE ENCOUNTER
Pt came into the office requesting a new medication. Pt would like to switch from Prozac to Luvox. Pt has been out for a week.

## 2022-04-29 NOTE — LETTER
NOTIFICATION RETURN TO WORK / SCHOOL    4/29/2022 4:14 PM    Mr. Henrik Martin  [address] [address]      To Whom It May Concern:    Henrik Martin is currently under the care of [facility]. He will return to work/school on: 5/3/22    If there are questions or concerns please have the patient contact our office.         Sincerely,      Theodore Martines MD

## 2022-04-29 NOTE — PROGRESS NOTES
Molly Crawford is a 61 y.o. male who was seen by synchronous (real-time) audio-video technology on 4/29/2022 for Medication Evaluation (would like to switch from Prozac to Luvox is currently out of medication. )        Progress Note           SUBJECTIVE:   Mr. Molly Crawford is a 61 y.o. male who is here for follow up of routine medical issues. Chief Complaint   Patient presents with    Medication Evaluation     would like to switch from Prozac to Luvox is currently out of medication. \"I did something stupid. I wanted to change my antidepressant for the OCD. I have been on a number of them since the 1990s. I went on prozac most recently because I was having stress at work and anxiety attacks. But it has side effects, sexual and others. I ran out of it 2 weeks ago. I meant to set up an appointment, and I didn't have time. However, coming off of it, apparently I wasn't holding it together as well as I thought I was. \" Interested in fluovoxamine. He notices improvement in sex drive and better orgasms. Not sleeping. He needs note to go back to work. Other issues deferred. OBJECTIVE:   Vitals: There were no vitals taken for this visit. Gen: Pleasant 61 y.o.  male in NAD. ASSESSMENT/ PLAN: Diagnoses and all orders for this visit:    1. Depression with Anxiety: Resume SSRI--we'll start flovoxamine as discussed. RTC 6-8 weeks for follow up. Work note given to go back next week. Objective:   No flowsheet data found.      [INSTRUCTIONS:  \"[x]\" Indicates a positive item  \"[]\" Indicates a negative item  -- DELETE ALL ITEMS NOT EXAMINED]    Constitutional: [x] Appears well-developed and well-nourished [x] No apparent distress      [] Abnormal -     Mental status: [x] Alert and awake  [x] Oriented to person/place/time [x] Able to follow commands    [] Abnormal -     Eyes:   EOM    [x]  Normal    [] Abnormal -   Sclera  [x]  Normal    [] Abnormal - Discharge [x]  None visible   [] Abnormal -     HENT: [x] Normocephalic, atraumatic  [] Abnormal -   [x] Mouth/Throat: Mucous membranes are moist    External Ears [x] Normal  [] Abnormal -    Neck: [x] No visualized mass [] Abnormal -     Pulmonary/Chest: [x] Respiratory effort normal   [x] No visualized signs of difficulty breathing or respiratory distress        [] Abnormal -      Musculoskeletal:   [x] Normal gait with no signs of ataxia         [x] Normal range of motion of neck        [] Abnormal -     Neurological:        [x] No Facial Asymmetry (Cranial nerve 7 motor function) (limited exam due to video visit)          [x] No gaze palsy        [] Abnormal -          Skin:        [x] No significant exanthematous lesions or discoloration noted on facial skin         [] Abnormal -            Psychiatric:       [x] Normal Affect [] Abnormal -       Other pertinent observable physical exam findings:-        We discussed the expected course, resolution and complications of the diagnosis(es) in detail. Medication risks, benefits, costs, interactions, and alternatives were discussed as indicated. I advised him to contact the office if his condition worsens, changes or fails to improve as anticipated. He expressed understanding with the diagnosis(es) and plan. Dinesh Mcdonnell, who was evaluated through a patient-initiated, synchronous (real-time) audio-video encounter, and/or his healthcare decision maker, is aware that it is a billable service, with coverage as determined by his insurance carrier. He provided verbal consent to proceed: YES, and patient identification was verified. It was conducted pursuant to the emergency declaration under the [address] [place] [address] and the [employer] General Act. A caregiver was present when appropriate. Ability to conduct physical exam was limited. I was not in office.  The patient was at home or otherwise outside the office.       Juani Kramer MD

## 2022-04-29 NOTE — PROGRESS NOTES
1. \"Have you been to the ER, urgent care clinic since your last visit? Hospitalized since your last visit? \" No    2. \"Have you seen or consulted any other health care providers outside of the [address] [place] [address] since your last visit? \" No     3. For patients aged 39-70: Has the patient had a colonoscopy / FIT/ Cologuard? No      If the patient is female:    4. For patients aged 41-77: Has the patient had a mammogram within the past 2 years? NA - based on age or sex      11. For patients aged 21-65: Has the patient had a pap smear?  NA - based on age or sex

## 2022-05-03 ENCOUNTER — TELEPHONE (OUTPATIENT)
Dept: INTERNAL MEDICINE CLINIC | Age: 57
End: 2022-05-03

## 2022-05-03 NOTE — TELEPHONE ENCOUNTER
Left message for patient to return charu.  Leaving patients updated return to work note at the  for pickup.   Asiya Rowe LPN

## 2022-05-03 NOTE — TELEPHONE ENCOUNTER
#[phone]  Pt states he would like to  the letter that was written for him today. Pt does need this changed prior to printing for pt. He would like the return to work date of 5-4-22, not 5-3-22. Please call pt when ready to  today in office.

## 2022-07-27 RX ORDER — CYCLOBENZAPRINE HCL 10 MG
TABLET ORAL
Qty: 30 TABLET | Refills: 1 | Status: SHIPPED | OUTPATIENT
Start: 2022-07-27

## 2022-07-29 ENCOUNTER — TELEPHONE (OUTPATIENT)
Dept: INTERNAL MEDICINE CLINIC | Age: 57
End: 2022-07-29

## 2022-07-29 DIAGNOSIS — G47.00 INSOMNIA, UNSPECIFIED TYPE: ICD-10-CM

## 2022-08-01 RX ORDER — AMLODIPINE BESYLATE AND ATORVASTATIN CALCIUM 10; 10 MG/1; MG/1
1 TABLET, FILM COATED ORAL DAILY
Qty: 90 TABLET | Refills: 3 | Status: SHIPPED | OUTPATIENT
Start: 2022-08-01

## 2022-08-01 RX ORDER — ZOLPIDEM TARTRATE 10 MG/1
TABLET ORAL
Qty: 30 TABLET | Refills: 5 | Status: SHIPPED | OUTPATIENT
Start: 2022-08-01 | End: 2022-09-27 | Stop reason: SDUPTHER

## 2022-08-04 ENCOUNTER — OFFICE VISIT (OUTPATIENT)
Dept: INTERNAL MEDICINE CLINIC | Age: 57
End: 2022-08-04
Payer: COMMERCIAL

## 2022-08-04 VITALS
DIASTOLIC BLOOD PRESSURE: 60 MMHG | HEIGHT: 71 IN | HEART RATE: 64 BPM | TEMPERATURE: 97.2 F | BODY MASS INDEX: 28.73 KG/M2 | RESPIRATION RATE: 16 BRPM | WEIGHT: 205.2 LBS | SYSTOLIC BLOOD PRESSURE: 98 MMHG | OXYGEN SATURATION: 98 %

## 2022-08-04 DIAGNOSIS — E78.2 MIXED HYPERLIPIDEMIA: ICD-10-CM

## 2022-08-04 DIAGNOSIS — H53.2 DIPLOPIA: Primary | ICD-10-CM

## 2022-08-04 DIAGNOSIS — G47.00 INSOMNIA, UNSPECIFIED TYPE: ICD-10-CM

## 2022-08-04 DIAGNOSIS — F41.9 ANXIETY: ICD-10-CM

## 2022-08-04 DIAGNOSIS — I10 ESSENTIAL HYPERTENSION: ICD-10-CM

## 2022-08-04 PROCEDURE — 99214 OFFICE O/P EST MOD 30 MIN: CPT | Performed by: INTERNAL MEDICINE
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
## 2022-08-04 NOTE — PROGRESS NOTES
1. \"Have you been to the ER, urgent care clinic since your last visit? Hospitalized since your last visit? \" No    2. \"Have you seen or consulted any other health care providers outside of the [address] [place] [address] since your last visit? \" No     3. For patients aged 39-70: Has the patient had a colonoscopy / FIT/ Cologuard?  7/2022

## 2022-08-05 LAB
ALBUMIN SERPL-MCNC: 4 G/DL (ref 3.5–5)
ALBUMIN/GLOB SERPL: 1.5 {RATIO} (ref 1.1–2.2)
ALP SERPL-CCNC: 44 U/L (ref 45–117)
ALT SERPL-CCNC: 30 U/L (ref 12–78)
ANION GAP SERPL CALC-SCNC: 8 MMOL/L (ref 5–15)
AST SERPL-CCNC: 15 U/L (ref 15–37)
BASOPHILS # BLD: 0 K/UL (ref 0–0.1)
BASOPHILS NFR BLD: 1 % (ref 0–1)
BILIRUB SERPL-MCNC: 0.7 MG/DL (ref 0.2–1)
BUN SERPL-MCNC: 13 MG/DL (ref 6–20)
BUN/CREAT SERPL: 13 (ref 12–20)
CALCIUM SERPL-MCNC: 9.5 MG/DL (ref 8.5–10.1)
CHLORIDE SERPL-SCNC: 103 MMOL/L (ref 97–108)
CHOLEST SERPL-MCNC: 188 MG/DL
CO2 SERPL-SCNC: 27 MMOL/L (ref 21–32)
CREAT SERPL-MCNC: 1 MG/DL (ref 0.7–1.3)
DIFFERENTIAL METHOD BLD: ABNORMAL
EOSINOPHIL # BLD: 0.2 K/UL (ref 0–0.4)
EOSINOPHIL NFR BLD: 2 % (ref 0–7)
ERYTHROCYTE [DISTWIDTH] IN BLOOD BY AUTOMATED COUNT: 13.4 % (ref 11.5–14.5)
GLOBULIN SER CALC-MCNC: 2.7 G/DL (ref 2–4)
GLUCOSE SERPL-MCNC: 88 MG/DL (ref 65–100)
HCT VFR BLD AUTO: 47.6 % (ref 36.6–50.3)
HDLC SERPL-MCNC: 81 MG/DL
HDLC SERPL: 2.3 {RATIO} (ref 0–5)
HGB BLD-MCNC: 15.6 G/DL (ref 12.1–17)
IMM GRANULOCYTES # BLD AUTO: 0 K/UL (ref 0–0.04)
IMM GRANULOCYTES NFR BLD AUTO: 0 % (ref 0–0.5)
LDLC SERPL CALC-MCNC: 86.8 MG/DL (ref 0–100)
LYMPHOCYTES # BLD: 1.4 K/UL (ref 0.8–3.5)
LYMPHOCYTES NFR BLD: 22 % (ref 12–49)
MCH RBC QN AUTO: 33.9 PG (ref 26–34)
MCHC RBC AUTO-ENTMCNC: 32.8 G/DL (ref 30–36.5)
MCV RBC AUTO: 103.5 FL (ref 80–99)
MONOCYTES # BLD: 0.7 K/UL (ref 0–1)
MONOCYTES NFR BLD: 11 % (ref 5–13)
NEUTS SEG # BLD: 4.2 K/UL (ref 1.8–8)
NEUTS SEG NFR BLD: 64 % (ref 32–75)
NRBC # BLD: 0 K/UL (ref 0–0.01)
NRBC BLD-RTO: 0 PER 100 WBC
PLATELET # BLD AUTO: 186 K/UL (ref 150–400)
PMV BLD AUTO: 11.7 FL (ref 8.9–12.9)
POTASSIUM SERPL-SCNC: 4.6 MMOL/L (ref 3.5–5.1)
PROT SERPL-MCNC: 6.7 G/DL (ref 6.4–8.2)
RBC # BLD AUTO: 4.6 M/UL (ref 4.1–5.7)
SODIUM SERPL-SCNC: 138 MMOL/L (ref 136–145)
TRIGL SERPL-MCNC: 101 MG/DL (ref ?–150)
VLDLC SERPL CALC-MCNC: 20.2 MG/DL
WBC # BLD AUTO: 6.5 K/UL (ref 4.1–11.1)

## 2022-08-07 NOTE — PROGRESS NOTES
Please call the patient and let the patient know that his test result(s) is/are normal.  Thanks. Danny Sethi.

## 2022-08-08 NOTE — PROGRESS NOTES
Called, spoke with Pt. Two pt identifiers confirmed. Patient notified of results and response from provider     Pao Billingsley MD   8/7/2022  8:01 AM EDT Back to Top  Please call the patient and let the patient know that his test result(s) is/arenormal.  Thanks. Phil Jenknis. Pt verbalized understanding of information discussed w/ no further questions at this time.

## 2022-08-24 ENCOUNTER — HOSPITAL ENCOUNTER (OUTPATIENT)
Dept: VASCULAR SURGERY | Age: 57
Discharge: HOME | End: 2022-08-24
Attending: INTERNAL MEDICINE
Payer: COMMERCIAL

## 2022-08-24 DIAGNOSIS — H53.2 DIPLOPIA: ICD-10-CM

## 2022-08-24 DIAGNOSIS — I65.23 BILATERAL CAROTID ARTERY STENOSIS: Primary | ICD-10-CM

## 2022-08-24 LAB
LEFT CCA DIST DIAS: 12.8 CM/S
LEFT CCA DIST SYS: 70.9 CM/S
LEFT CCA PROX DIAS: 23 CM/S
LEFT CCA PROX SYS: 118.2 CM/S
LEFT ECA DIAS: 15.99 CM/S
LEFT ECA SYS: 80.6 CM/S
LEFT ICA DIST DIAS: 8.6 CM/S
LEFT ICA DIST SYS: 43.5 CM/S
LEFT ICA MID DIAS: 12 CM/S
LEFT ICA MID SYS: 45 CM/S
LEFT ICA PROX DIAS: 8.7 CM/S
LEFT ICA PROX SYS: 40.6 CM/S
LEFT ICA/CCA SYS: 0.63 NO UNITS
LEFT VERTEBRAL DIAS: 9.78 CM/S
LEFT VERTEBRAL SYS: 46.1 CM/S
RIGHT CCA DIST DIAS: 17.5 CM/S
RIGHT CCA DIST SYS: 108.2 CM/S
RIGHT CCA PROX DIAS: 21.5 CM/S
RIGHT CCA PROX SYS: 133.9 CM/S
RIGHT ECA DIAS: 11.63 CM/S
RIGHT ECA SYS: 96.4 CM/S
RIGHT ICA DIST DIAS: 15.7 CM/S
RIGHT ICA DIST SYS: 68.5 CM/S
RIGHT ICA MID DIAS: 12.8 CM/S
RIGHT ICA MID SYS: 58.1 CM/S
RIGHT ICA PROX DIAS: 11.5 CM/S
RIGHT ICA PROX SYS: 74.9 CM/S
RIGHT ICA/CCA SYS: 0.7 NO UNITS
RIGHT VERTEBRAL DIAS: 14.11 CM/S
RIGHT VERTEBRAL SYS: 55.5 CM/S
VAS LEFT SUBCLAVIAN PROX EDV: 0 CM/S
VAS LEFT SUBCLAVIAN PROX PSV: 153.1 CM/S
VAS RIGHT SUBCLAVIAN PROX EDV: 0 CM/S
VAS RIGHT SUBCLAVIAN PROX PSV: 87.3 CM/S

## 2022-08-24 PROCEDURE — 93880 EXTRACRANIAL BILAT STUDY: CPT | Performed by: PSYCHIATRY & NEUROLOGY

## 2022-08-24 PROCEDURE — 93880 EXTRACRANIAL BILAT STUDY: CPT

## 2022-09-14 NOTE — TELEPHONE ENCOUNTER
PCP: Adonay Bai MD    Last appt: 8/4/2022  No future appointments. Requested Prescriptions     Pending Prescriptions Disp Refills    sildenafiL (REVATIO) 20 mg tablet 6 Tablet 0     Sig: Take 1 Tablet by mouth three (3) times daily.

## 2022-09-15 RX ORDER — SILDENAFIL CITRATE 20 MG/1
20 TABLET ORAL 3 TIMES DAILY
Qty: 6 TABLET | Refills: 0 | Status: SHIPPED | OUTPATIENT
Start: 2022-09-15

## 2022-09-27 DIAGNOSIS — G47.00 INSOMNIA, UNSPECIFIED TYPE: ICD-10-CM

## 2022-09-27 RX ORDER — SILDENAFIL CITRATE 20 MG/1
20 TABLET ORAL 3 TIMES DAILY
Qty: 6 TABLET | Refills: 0 | Status: CANCELLED | OUTPATIENT
Start: 2022-09-27

## 2022-09-27 RX ORDER — ZOLPIDEM TARTRATE 10 MG/1
TABLET ORAL
Qty: 30 TABLET | Refills: 5 | Status: SHIPPED | OUTPATIENT
Start: 2022-09-27

## 2022-09-27 NOTE — TELEPHONE ENCOUNTER
PCP: Pantera Turner MD    Last appt: 8/4/2022  No future appointments.     Requested Prescriptions     Pending Prescriptions Disp Refills    zolpidem (AMBIEN) 10 mg tablet 30 Tablet 5     Sig: TAKE 1 TABLET BY MOUTH EVERY DAY AT BEDTIME AS NEEDED         Sildenafil prescribed on 9/15/2022

## 2022-10-20 RX ORDER — SILDENAFIL 100 MG/1
100 TABLET, FILM COATED ORAL
Qty: 6 TABLET | Refills: 11 | Status: SHIPPED | OUTPATIENT
Start: 2022-10-20

## 2022-11-21 DIAGNOSIS — F41.9 ANXIETY: ICD-10-CM

## 2022-11-22 RX ORDER — DIAZEPAM 2 MG/1
TABLET ORAL
Qty: 30 TABLET | Refills: 1 | Status: SHIPPED | OUTPATIENT
Start: 2022-11-22

## 2023-04-05 RX ORDER — LISINOPRIL 10 MG/1
TABLET ORAL
Qty: 90 TABLET | Refills: 3 | Status: SHIPPED
Start: 2023-04-05

## 2023-05-01 DIAGNOSIS — F41.9 ANXIETY: ICD-10-CM

## 2023-05-01 RX ORDER — DIAZEPAM 2 MG/1
TABLET ORAL
Qty: 30 TABLET | Refills: 1 | Status: SHIPPED | OUTPATIENT
Start: 2023-05-01

## 2023-05-01 NOTE — TELEPHONE ENCOUNTER
PCP: Geri Alvarez MD    Last appt: 8/4/2022  No future appointments.     Requested Prescriptions     Pending Prescriptions Disp Refills    diazePAM (VALIUM) 2 mg tablet 30 Tablet 1

## 2023-06-09 RX ORDER — FLUOXETINE HYDROCHLORIDE 20 MG/1
CAPSULE ORAL
Qty: 90 CAPSULE | Refills: 1 | Status: SHIPPED | OUTPATIENT
Start: 2023-06-09

## 2023-09-08 RX ORDER — AMLODIPINE BESYLATE AND ATORVASTATIN CALCIUM 10; 10 MG/1; MG/1
TABLET, FILM COATED ORAL
Qty: 90 TABLET | Refills: 3 | Status: SHIPPED | OUTPATIENT
Start: 2023-09-08